# Patient Record
Sex: MALE | Race: WHITE | Employment: FULL TIME | URBAN - METROPOLITAN AREA
[De-identification: names, ages, dates, MRNs, and addresses within clinical notes are randomized per-mention and may not be internally consistent; named-entity substitution may affect disease eponyms.]

---

## 2022-03-28 ENCOUNTER — HOSPITAL ENCOUNTER (EMERGENCY)
Age: 52
Discharge: HOME | End: 2022-03-29
Attending: EMERGENCY MEDICINE
Payer: OTHER MISCELLANEOUS

## 2022-03-28 VITALS
TEMPERATURE: 98 F | HEART RATE: 79 BPM | RESPIRATION RATE: 18 BRPM | SYSTOLIC BLOOD PRESSURE: 132 MMHG | DIASTOLIC BLOOD PRESSURE: 88 MMHG | WEIGHT: 145 LBS | OXYGEN SATURATION: 97 %

## 2022-03-28 DIAGNOSIS — S61.011A LACERATION OF RIGHT THUMB WITH COMPLICATION, INITIAL ENCOUNTER: Primary | ICD-10-CM

## 2022-03-28 PROCEDURE — 12001 RPR S/N/AX/GEN/TRNK 2.5CM/<: CPT

## 2022-03-28 PROCEDURE — 99283 EMERGENCY DEPT VISIT LOW MDM: CPT

## 2022-03-28 PROCEDURE — 90471 IMMUNIZATION ADMIN: CPT

## 2022-03-29 NOTE — ED INITIAL ASSESSMENT (HPI)
Pt to ed with c/o laceration to R thumb. States he cut it while lifting a box at work. Denies further injuries. Cms intact.

## 2022-03-29 NOTE — ED PROVIDER NOTES
Patient Seen in: THE [facility] Emergency Department In [place]      History   Patient presents with:  Laceration/Abrasion    Stated Complaint: Laceration to right thumb    Subjective:   HPI    This is a 49-year-old male who cut his right thumb. Was with a sharp object itself. Patient denies any other injury he says it continues to open up since is on the extensor surface of the patient now presents emergency department. Patient denies any other complaints    Objective:   No pertinent past medical history. No pertinent past surgical history. No pertinent social history. Review of Systems    Positive for stated complaint: Laceration to right thumb  Other systems are as noted in HPI. Constitutional and vital signs reviewed. All other systems reviewed and negative except as noted above. Physical Exam     ED Triage Vitals   BP    Pulse    Resp    Temp    Temp src    SpO2    O2 Device        Current:There were no vitals taken for this visit. Physical Exam  Right thumb exam there is a 1/2 cm laceration over the proximal phalanx extensor surface no difficulty and extending distally neurovascular tact noted to be distally. The patient's capillary refill less than 2 seconds no other injury noted to the right upper extremity       ED Course   Labs Reviewed - No data to display       Differential diagnosis includes laceration, extensor tendon injury, digital nerve laceration         MDM      Patient has no signs of neurovascular compromise with his laceration. He was anesthetized using half cc of 1% lidocaine solution with good anesthetic effect wound was thoroughly irrigated and cleaned and explored with no tenderness involvement.   4 simple interrupted sutures using 4-0 Prolene material approximated wound edges adequately wound was dressed at this is a patient will be discharged home sutures removed in 7 to 10 days                              Disposition and Plan Clinical Impression:  Laceration of right thumb without foreign body without damage to nail, initial encounter  (primary encounter diagnosis)     Disposition:  Discharge  3/28/2022 11:16 pm    Follow-up:  No follow-up provider specified. Medications Prescribed:  There are no discharge medications for this patient.

## 2022-04-07 ENCOUNTER — HOSPITAL ENCOUNTER (EMERGENCY)
Age: 52
Discharge: HOME | End: 2022-04-07
Payer: OTHER MISCELLANEOUS

## 2022-04-07 VITALS
TEMPERATURE: 98 F | HEART RATE: 66 BPM | RESPIRATION RATE: 18 BRPM | OXYGEN SATURATION: 100 % | SYSTOLIC BLOOD PRESSURE: 139 MMHG | BODY MASS INDEX: 23.32 KG/M2 | DIASTOLIC BLOOD PRESSURE: 88 MMHG | HEIGHT: 64.96 IN | WEIGHT: 140 LBS

## 2022-04-07 DIAGNOSIS — Z48.02 ENCOUNTER FOR REMOVAL OF SUTURES: Primary | ICD-10-CM

## 2023-11-13 ENCOUNTER — APPOINTMENT (OUTPATIENT)
Dept: FAMILY MEDICINE | Age: 53
End: 2023-11-13